# Patient Record
Sex: MALE | Race: WHITE | Employment: UNEMPLOYED | URBAN - NONMETROPOLITAN AREA
[De-identification: names, ages, dates, MRNs, and addresses within clinical notes are randomized per-mention and may not be internally consistent; named-entity substitution may affect disease eponyms.]

---

## 2023-09-05 ENCOUNTER — HOSPITAL ENCOUNTER (INPATIENT)
Facility: HOSPITAL | Age: 68
LOS: 9 days | Discharge: HOME | DRG: 885 | End: 2023-09-14
Attending: PSYCHIATRY & NEUROLOGY | Admitting: PSYCHIATRY & NEUROLOGY
Payer: MEDICARE

## 2023-09-05 PROBLEM — F32.9 MAJOR DEPRESSIVE DISORDER WITH SINGLE EPISODE: Status: ACTIVE | Noted: 2023-09-05

## 2023-09-05 PROCEDURE — 1240000000 HC EMOTIONAL WELLNESS R&B

## 2023-09-05 PROCEDURE — 6370000000 HC RX 637 (ALT 250 FOR IP): Performed by: PSYCHIATRY & NEUROLOGY

## 2023-09-05 PROCEDURE — 97161 PT EVAL LOW COMPLEX 20 MIN: CPT

## 2023-09-05 RX ORDER — LISINOPRIL 10 MG/1
10 TABLET ORAL DAILY
Status: ON HOLD | COMMUNITY
End: 2023-09-14 | Stop reason: SDUPTHER

## 2023-09-05 RX ORDER — HYDROCHLOROTHIAZIDE 12.5 MG/1
12.5 CAPSULE, GELATIN COATED ORAL DAILY
Status: ON HOLD | COMMUNITY
End: 2023-09-14 | Stop reason: SDUPTHER

## 2023-09-05 RX ORDER — HALOPERIDOL 5 MG/1
5 TABLET ORAL EVERY 4 HOURS PRN
Status: DISCONTINUED | OUTPATIENT
Start: 2023-09-05 | End: 2023-09-14 | Stop reason: HOSPADM

## 2023-09-05 RX ORDER — HYDROCHLOROTHIAZIDE 12.5 MG/1
12.5 CAPSULE, GELATIN COATED ORAL DAILY
Status: DISCONTINUED | OUTPATIENT
Start: 2023-09-05 | End: 2023-09-14 | Stop reason: HOSPADM

## 2023-09-05 RX ORDER — ACETAMINOPHEN 325 MG/1
650 TABLET ORAL EVERY 4 HOURS PRN
Status: DISCONTINUED | OUTPATIENT
Start: 2023-09-05 | End: 2023-09-14 | Stop reason: HOSPADM

## 2023-09-05 RX ORDER — HALOPERIDOL 5 MG/ML
5 INJECTION INTRAMUSCULAR EVERY 4 HOURS PRN
Status: DISCONTINUED | OUTPATIENT
Start: 2023-09-05 | End: 2023-09-14 | Stop reason: HOSPADM

## 2023-09-05 RX ORDER — NICOTINE 21 MG/24HR
1 PATCH, TRANSDERMAL 24 HOURS TRANSDERMAL DAILY
Status: DISCONTINUED | OUTPATIENT
Start: 2023-09-05 | End: 2023-09-14 | Stop reason: HOSPADM

## 2023-09-05 RX ORDER — LISINOPRIL 10 MG/1
10 TABLET ORAL DAILY
Status: DISCONTINUED | OUTPATIENT
Start: 2023-09-05 | End: 2023-09-14 | Stop reason: HOSPADM

## 2023-09-05 RX ORDER — DIPHENHYDRAMINE HYDROCHLORIDE 50 MG/ML
50 INJECTION INTRAMUSCULAR; INTRAVENOUS EVERY 4 HOURS PRN
Status: DISCONTINUED | OUTPATIENT
Start: 2023-09-05 | End: 2023-09-14 | Stop reason: HOSPADM

## 2023-09-05 RX ORDER — POLYETHYLENE GLYCOL 3350 17 G/17G
17 POWDER, FOR SOLUTION ORAL DAILY PRN
Status: DISCONTINUED | OUTPATIENT
Start: 2023-09-05 | End: 2023-09-14 | Stop reason: HOSPADM

## 2023-09-05 RX ORDER — MAGNESIUM HYDROXIDE/ALUMINUM HYDROXICE/SIMETHICONE 120; 1200; 1200 MG/30ML; MG/30ML; MG/30ML
30 SUSPENSION ORAL EVERY 6 HOURS PRN
Status: DISCONTINUED | OUTPATIENT
Start: 2023-09-05 | End: 2023-09-14 | Stop reason: HOSPADM

## 2023-09-05 RX ORDER — TRAZODONE HYDROCHLORIDE 50 MG/1
50 TABLET ORAL NIGHTLY PRN
Status: DISCONTINUED | OUTPATIENT
Start: 2023-09-05 | End: 2023-09-14 | Stop reason: HOSPADM

## 2023-09-05 RX ADMIN — HYDROCHLOROTHIAZIDE 12.5 MG: 12.5 CAPSULE ORAL at 14:35

## 2023-09-05 RX ADMIN — LISINOPRIL 10 MG: 10 TABLET ORAL at 14:35

## 2023-09-05 RX ADMIN — TRAZODONE HYDROCHLORIDE 50 MG: 50 TABLET ORAL at 21:54

## 2023-09-05 RX ADMIN — METFORMIN HYDROCHLORIDE 500 MG: 500 TABLET ORAL at 18:45

## 2023-09-05 ASSESSMENT — LIFESTYLE VARIABLES
HOW MANY STANDARD DRINKS CONTAINING ALCOHOL DO YOU HAVE ON A TYPICAL DAY: 1 OR 2
HOW OFTEN DO YOU HAVE A DRINK CONTAINING ALCOHOL: 2-4 TIMES A MONTH

## 2023-09-05 ASSESSMENT — PAIN DESCRIPTION - FREQUENCY: FREQUENCY: CONTINUOUS

## 2023-09-05 ASSESSMENT — PAIN DESCRIPTION - LOCATION: LOCATION: KNEE;BACK;NECK

## 2023-09-05 ASSESSMENT — SLEEP AND FATIGUE QUESTIONNAIRES
AVERAGE NUMBER OF SLEEP HOURS: 4
DO YOU USE A SLEEP AID: NO
DO YOU HAVE DIFFICULTY SLEEPING: NO

## 2023-09-05 ASSESSMENT — PAIN DESCRIPTION - DESCRIPTORS: DESCRIPTORS: ACHING

## 2023-09-05 ASSESSMENT — PAIN DESCRIPTION - ORIENTATION: ORIENTATION: LOWER

## 2023-09-05 ASSESSMENT — PAIN SCALES - GENERAL: PAINLEVEL_OUTOF10: 6

## 2023-09-05 ASSESSMENT — PAIN DESCRIPTION - ONSET: ONSET: ON-GOING

## 2023-09-05 ASSESSMENT — PAIN DESCRIPTION - PAIN TYPE: TYPE: CHRONIC PAIN

## 2023-09-05 NOTE — GROUP NOTE
Group Therapy Note    Date: 9/5/2023    Group Start Time: 1245  Group End Time: 1320  Group Topic: Education Group - Inpatient     [address]        Group Therapy Note    Attendees: 3/4    Writer facilitated a psych-ed group about toxic positivity. Writer provided a handout about various examples of toxic positivity statements as opposed to validating statements. Writer encouraged patients to share and ask questions. Writer concluded session with a grounding exercise. Pt in the process of being admitted at time of group.            Signature:  Belkys Rojas

## 2023-09-05 NOTE — GROUP NOTE
Group Therapy Note    Date: 9/5/2023    Group Start Time: 4089  Group End Time: 1600  Group Topic: Activity    [facility] [address], LPN        Group Therapy Note    Attendees: 4       Patient's Goal: TO REST    Notes:  RELAXATION TECHNIQUES    Status After Intervention:  Improved    Participation Level:  Active Listener    Participation Quality: Appropriate and Attentive      Speech:  normal      Thought Process/Content: Logical      Affective Functioning: Congruent      Mood:  CALM      Level of consciousness:  Alert      Response to Learning: Able to verbalize current knowledge/experience      Endings: None Reported    Modes of Intervention: Socialization      Discipline Responsible: Behavorial Health Tech      Signature:  Cara Sultana LPN

## 2023-09-05 NOTE — GROUP NOTE
Group Therapy Note    Date: 9/5/2023    Group Start Time: 1320  Group End Time: 1400  Group Topic: Process Group - Inpatient     [address]        Group Therapy Note    Attendees: 3/4    Writer facilitated an inpatient process group. Writer encouraged patients to engage in an expressive arts activity in which patients were asked to create vision boards. Writer held the space as patients discussed discharge plans, expressed feelings, etc. Writer concluded session with some mindfulness exercises and encouraging patients to ask questions and provide input and feedback regarding the group. Pt came at the end of group. Pt in process of being admitted.        Signature:  Eze Elder

## 2023-09-06 LAB
CHOLEST SERPL-MCNC: 169 MG/DL
DATE LAST DOSE: NORMAL
DOSE AMOUNT: NORMAL UNITS
EST. AVERAGE GLUCOSE BLD GHB EST-MCNC: 160 MG/DL
GLUCOSE BLD STRIP.AUTO-MCNC: 160 MG/DL (ref 65–100)
GLUCOSE BLD STRIP.AUTO-MCNC: 199 MG/DL (ref 65–100)
GLUCOSE BLD STRIP.AUTO-MCNC: 220 MG/DL (ref 65–100)
HBA1C MFR BLD: 7.2 % (ref 4–5.6)
HDLC SERPL-MCNC: 81 MG/DL
HDLC SERPL: 2.1 (ref 0–5)
LDLC SERPL CALC-MCNC: 68.8 MG/DL (ref 0–100)
LIPID PANEL: NORMAL
LITHIUM SERPL-SCNC: <0.2 MMOL/L
PERFORMED BY:: ABNORMAL
TRIGL SERPL-MCNC: 96 MG/DL
VLDLC SERPL CALC-MCNC: 19.2 MG/DL

## 2023-09-06 PROCEDURE — 80178 ASSAY OF LITHIUM: CPT

## 2023-09-06 PROCEDURE — 1240000000 HC EMOTIONAL WELLNESS R&B

## 2023-09-06 PROCEDURE — 83036 HEMOGLOBIN GLYCOSYLATED A1C: CPT

## 2023-09-06 PROCEDURE — 82962 GLUCOSE BLOOD TEST: CPT

## 2023-09-06 PROCEDURE — 80061 LIPID PANEL: CPT

## 2023-09-06 PROCEDURE — 6370000000 HC RX 637 (ALT 250 FOR IP): Performed by: PSYCHIATRY & NEUROLOGY

## 2023-09-06 PROCEDURE — 36415 COLL VENOUS BLD VENIPUNCTURE: CPT

## 2023-09-06 PROCEDURE — 6370000000 HC RX 637 (ALT 250 FOR IP): Performed by: PHYSICIAN ASSISTANT

## 2023-09-06 RX ORDER — LITHIUM CARBONATE 300 MG/1
300 CAPSULE ORAL 2 TIMES DAILY
Status: DISCONTINUED | OUTPATIENT
Start: 2023-09-06 | End: 2023-09-09

## 2023-09-06 RX ORDER — INSULIN LISPRO 100 [IU]/ML
0-8 INJECTION, SOLUTION INTRAVENOUS; SUBCUTANEOUS
Status: DISCONTINUED | OUTPATIENT
Start: 2023-09-06 | End: 2023-09-14 | Stop reason: HOSPADM

## 2023-09-06 RX ADMIN — ACETAMINOPHEN 650 MG: 325 TABLET, FILM COATED ORAL at 13:02

## 2023-09-06 RX ADMIN — LISINOPRIL 10 MG: 10 TABLET ORAL at 08:18

## 2023-09-06 RX ADMIN — METFORMIN HYDROCHLORIDE 500 MG: 500 TABLET ORAL at 17:14

## 2023-09-06 RX ADMIN — LITHIUM CARBONATE 300 MG: 300 CAPSULE, GELATIN COATED ORAL at 20:16

## 2023-09-06 RX ADMIN — METFORMIN HYDROCHLORIDE 500 MG: 500 TABLET ORAL at 08:18

## 2023-09-06 RX ADMIN — HYDROCHLOROTHIAZIDE 12.5 MG: 12.5 CAPSULE ORAL at 08:18

## 2023-09-06 ASSESSMENT — ENCOUNTER SYMPTOMS
ALLERGIC/IMMUNOLOGIC NEGATIVE: 1
EYES NEGATIVE: 1
GASTROINTESTINAL NEGATIVE: 1
RESPIRATORY NEGATIVE: 1

## 2023-09-06 ASSESSMENT — PAIN DESCRIPTION - LOCATION: LOCATION: GENERALIZED

## 2023-09-06 ASSESSMENT — PAIN - FUNCTIONAL ASSESSMENT: PAIN_FUNCTIONAL_ASSESSMENT: ACTIVITIES ARE NOT PREVENTED

## 2023-09-06 ASSESSMENT — PAIN DESCRIPTION - DESCRIPTORS: DESCRIPTORS: ACHING

## 2023-09-06 NOTE — GROUP NOTE
Group Therapy Note    Date: 9/6/2023    Group Start Time: 1600  Group End Time: 8047  Group Topic: Nursing    [facility] [address] Annalise Monroe RN      Discharge Planning and Resources  Group Therapy Note    Attendees: 6/7       Patient's Goal:      Notes: It attended group, participating well. Difficulty staying on topic. Status After Intervention:  Unchanged    Participation Level:  Active Listener and Interactive, monopolizing    Participation Quality: Appropriate, Attentive, and Sharing      Speech:  normal      Thought Process/Content: Delusional      Affective Functioning: Congruent      Mood: elevated      Level of consciousness:  Alert, Oriented x4, and Attentive      Response to Learning: Able to verbalize current knowledge/experience, Able to verbalize/acknowledge new learning, Able to retain information, Capable of insight, Able to change behavior, and Progressing to goal      Endings: None Reported    Modes of Intervention: Education and Support      Discipline Responsible: Registered Nurse      Signature:  Kenia Jewell RN

## 2023-09-06 NOTE — GROUP NOTE
Group Therapy Note    Date: 9/6/2023    Group Start Time: 1410  Group End Time: 8224  Group Topic: Process Group - Inpatient     [address]        Group Therapy Note    Attendees: 5/7    Writer facilitated an inpatient process group. Writer implemented various mindfulness and expressive arts exercises. Writer held the space as patients processed. Writer encouraged patients to process any feelings they may be having, discuss discharge plans, etc. Writer reviewed coping skills and topics learned earlier in the day. Writer concluded session with a grounding exercise and encouraging patients to provide input and feedback regarding the group. Patient's Goal:  To attend and participate in groups and activities daily. Notes:  Pt actively participated. Pt engaged in mindfulness and was able to discuss discharge plans. Pt manic and interrupting but able to be redirected. Status After Intervention:  Improved    Participation Level:  Active Listener and Interactive    Participation Quality: Appropriate, Attentive, Sharing, and Supportive      Speech:  normal      Thought Process/Content: Logical  Linear  Flight of ideas      Affective Functioning: Congruent      Mood: euthymic      Level of consciousness:  Alert, Oriented x4, and Attentive      Response to Learning: Able to verbalize/acknowledge new learning, Able to retain information, Capable of insight, and Progressing to goal      Endings: None Reported    Modes of Intervention: Exploration and Activity      Discipline Responsible: /Counselor      Signature:  Nadeen Pimentel, [address]

## 2023-09-06 NOTE — GROUP NOTE
Group Therapy Note    Date: 9/6/2023    Group Start Time: 1330  Group End Time: 0019  Group Topic: Education Group - Inpatient     [address]        Group Therapy Note    Attendees: 5/7    Writer facilitated an inpatient psych-ed group. Writer provided a CBT handout regarding core beliefs and encouraged patients to fill out the information regarding negative core beliefs they feel they need to work on. Writer provided education regarding how core beliefs can impact our way of thinking and mental health. Writer concluded session with a grounding exercise and encouraging patients to share and ask questions. Patient's Goal:  To attend and participate in groups and activities daily    Notes:  Pt actively participated. Pt was able to discuss his feelings regarding negative core beliefs. Pt slightly manic but able to be redirected. Status After Intervention:  Improved    Participation Level:  Active Listener and Interactive    Participation Quality: Appropriate, Attentive, Sharing, and Supportive      Speech:  loud      Thought Process/Content: Logical  Linear  Flight of ideas      Affective Functioning: Congruent      Mood: euthymic      Level of consciousness:  Alert, Oriented x4, and Attentive      Response to Learning: Able to verbalize current knowledge/experience, Able to verbalize/acknowledge new learning, Able to retain information, and Progressing to goal      Endings: None Reported    Modes of Intervention: Education      Discipline Responsible: /Counselor      Signature:  Asad Baptiste [facility]

## 2023-09-06 NOTE — PLAN OF CARE
Problem: Discharge Planning  Goal: Discharge to home or other facility with appropriate resources  Outcome: Progressing     Problem: Risk for Elopement  Goal: Patient will not exit the unit/facility without proper excort  9/5/2023 2318 by Windy Mishra RN  Outcome: Progressing  9/5/2023 1614 by Juan Cadena LPN  Outcome: Progressing     Problem: Safety - Adult  Goal: Free from fall injury  9/5/2023 2318 by Windy Mishra RN  Outcome: Progressing  9/5/2023 1614 by Juan Cadena LPN  Outcome: Progressing     Problem: Pain  Goal: Verbalizes/displays adequate comfort level or baseline comfort level  Outcome: Progressing

## 2023-09-06 NOTE — CONSULTS
Hospitalist Consult Note             Chief Complaints:   Suicidal ideation      Subjective:     Kailee Stewart is a 80 y.o. male followed by None None and  has a past medical history of Diabetes mellitus ([address]) and Hypertension. Presents with suicidal ideations with thoughts of jumping off a bridge near train tracks and running into traffic when he was initially evaluated on behavioral health screening but did not have any complaint on my evaluation. He admitted not taking anything for his blood pressure and he stated that his diabetes is well controlled off medications. Patient had a number of grandiose stories and unable to confirm. Patient appears hyperverbal and has been very sociable since arriving to the behavioral health unit. He was initially evaluated in [facility] emergency room and was then referred for admission to behavioral health unit for further evaluation and treatment      Past Medical History:   Diagnosis Date    Diabetes mellitus ([address])     Hypertension       No past surgical history on file. Family History   Problem Relation Age of Onset    Cancer Mother         Polio    Cancer Sister         Breast      Social History     Tobacco Use    Smoking status: Former     Types: Cigars    Smokeless tobacco: Former     Types: Chew   Substance Use Topics    Alcohol use: Yes     Alcohol/week: 2.0 standard drinks     Types: 2 Cans of beer per week       Prior to Admission medications    Medication Sig Start Date End Date Taking? Authorizing Provider   hydroCHLOROthiazide (MICROZIDE) 12.5 MG capsule Take 1 capsule by mouth daily   Yes Historical Provider, MD   lisinopril (PRINIVIL;ZESTRIL) 10 MG tablet Take 1 tablet by mouth daily   Yes Historical Provider, MD   metFORMIN (GLUCOPHAGE) 500 MG tablet Take 1 tablet by mouth 2 times daily (with meals)   Yes Historical Provider, MD     No Known Allergies     Review of Systems:  Review of Systems   Constitutional: Negative. HENT: Negative.      Eyes: Communicate Fall Risk and Risk Factors to all staff, patient, and family/Reinforce activity limits and safety measures with patient and family/Visual Cue: Yellow wristband/Bed in lowest position, wheels locked, appropriate side rails in place/Call bell, personal items and telephone in reach/Instruct patient to call for assistance before getting out of bed or chair/Non-slip footwear when patient is out of bed/[place] to call system/Physically safe environment - no spills, clutter or unnecessary equipment/Purposeful Proactive Rounding/Room/bathroom lighting operational, light cord in reach

## 2023-09-06 NOTE — GROUP NOTE
Group Therapy Note    Date: 9/6/2023    Group Start Time: 1000  Group End Time: 0926  Group Topic: Community Meeting    [facility] [address], LPN        Group Therapy Note    Attendees: 6       Patient's Goal:  HELP MYSELF AND OTHERS TO GET WELL AND MAKE NEW FRIENDS. Notes:  COMMUNITY    Status After Intervention:  Unchanged    Participation Level:  Active Listener    Participation Quality: Appropriate and Attentive      Speech:  normal      Thought Process/Content: Logical      Affective Functioning: Congruent      Mood:  CALM      Level of consciousness:  Alert      Response to Learning: Able to verbalize current knowledge/experience      Endings: None Reported    Modes of Intervention: Support      Discipline Responsible: Behavorial Health Tech      Signature:  Anya Goode LPN

## 2023-09-07 LAB
GLUCOSE BLD STRIP.AUTO-MCNC: 139 MG/DL (ref 65–100)
GLUCOSE BLD STRIP.AUTO-MCNC: 147 MG/DL (ref 65–100)
GLUCOSE BLD STRIP.AUTO-MCNC: 183 MG/DL (ref 65–100)
GLUCOSE BLD STRIP.AUTO-MCNC: 235 MG/DL (ref 65–100)
PERFORMED BY:: ABNORMAL

## 2023-09-07 PROCEDURE — 82962 GLUCOSE BLOOD TEST: CPT

## 2023-09-07 PROCEDURE — 6370000000 HC RX 637 (ALT 250 FOR IP): Performed by: PHYSICIAN ASSISTANT

## 2023-09-07 PROCEDURE — 1240000000 HC EMOTIONAL WELLNESS R&B

## 2023-09-07 PROCEDURE — 6370000000 HC RX 637 (ALT 250 FOR IP): Performed by: PSYCHIATRY & NEUROLOGY

## 2023-09-07 RX ADMIN — TRAZODONE HYDROCHLORIDE 50 MG: 50 TABLET ORAL at 21:11

## 2023-09-07 RX ADMIN — HYDROCHLOROTHIAZIDE 12.5 MG: 12.5 CAPSULE ORAL at 08:05

## 2023-09-07 RX ADMIN — LITHIUM CARBONATE 300 MG: 300 CAPSULE, GELATIN COATED ORAL at 08:05

## 2023-09-07 RX ADMIN — LISINOPRIL 10 MG: 10 TABLET ORAL at 08:05

## 2023-09-07 RX ADMIN — LITHIUM CARBONATE 300 MG: 300 CAPSULE, GELATIN COATED ORAL at 21:00

## 2023-09-07 RX ADMIN — METFORMIN HYDROCHLORIDE 500 MG: 500 TABLET ORAL at 17:06

## 2023-09-07 RX ADMIN — METFORMIN HYDROCHLORIDE 500 MG: 500 TABLET ORAL at 08:05

## 2023-09-07 ASSESSMENT — PAIN SCALES - GENERAL
PAINLEVEL_OUTOF10: 0

## 2023-09-07 NOTE — GROUP NOTE
Group Therapy Note    Date: 9/7/2023    Group Start Time: 0238  Group End Time: 7286  Group Topic: Education Group - Inpatient     [address]        Group Therapy Note    Attendees: 5/6    Writer facilitated an inpatient psych-ed group. Writer provided a handout regarding \"Fair Fighting Rules\" and discussed assertive communication versus aggressive communication. Writer encouraged patients to share areas they feel they need to work on. Writer concluded session with a grounding exercise and review. Patient's Goal:  To attend and participate in groups and activities daily. Notes:  Pt actively participated. Pt was able to discuss examples of fair fighting rules and gained some insight regarding communication. Status After Intervention:  Improved    Participation Level:  Active Listener and Interactive    Participation Quality: Appropriate, Attentive, Sharing, and Supportive      Speech:  normal      Thought Process/Content: Logical  Linear      Affective Functioning: Congruent      Mood: elevated      Level of consciousness:  Alert, Oriented x4, and Attentive      Response to Learning: Able to verbalize current knowledge/experience, Able to retain information, Capable of insight, and Progressing to goal      Endings: None Reported    Modes of Intervention: Education      Discipline Responsible: /Counselor      Signature:  Zayra Stern"[employer]"

## 2023-09-07 NOTE — CARE COORDINATION
09/07/23 1122   ITP   Date of Plan 09/07/23   Primary Diagnosis Code Major Depressive disorder with single episode   Barriers to Treatment Client resistance; Need for psychoeducation;Psychiatric symptom (comment)   Strengths Incorporated in Plan Acknowledging need for assistance;Community supports; Family supports; Natural supports;Postive outlook; Seeking interactions   Plan of Care   Long Term Goal (LTG) Stated in patient/guardian terms See PSA   Short Term Goal 1   Short Term Goal 1 Client will participate in CBT treatment and education process   Baseline Functioning Unknown at this time   Objectives Client will participate in individual therapy;Client will participate in group therapy   Intervention 1 Group therapy   Frequency Daily   Measured by Behavioral data; Self report;Staff observation   Staff Responsible [facility] staff;Clinical staff   Intervention 2 Acknowledge client strengths   Frequency Daily   Measured by Behavioral data; Self report;Staff observation   Staff Responsible [facility] staff;Clinical staff   STG Goal 1 Status: Patient Appears to be  Partially meeting treatment plan goal   Short Term Goal 2   Short Term Goal 2 Client will maintain compliance with medication regime   Baseline Functioning Pt reports being prescribed lithium before   Objectives Client will participate in individual therapy;Client will participate in group therapy   Intervention 1 Monitor medications   Frequency Daily   Measured by Staff observation;Behavioral data   Staff Responsible [facility] staff   STG Goal 2 Status: Patient Appears to be  Progressing toward treatment plan goal   Crisis/Safety/Discharge Plan   Crisis/Safety Plan Individualized plan (comment)   Discharge Plan TBD

## 2023-09-07 NOTE — GROUP NOTE
Group Therapy Note    Date: 9/7/2023    Group Start Time: 1410  Group End Time: 1500  Group Topic: Process Group - Inpatient     [address]        Group Therapy Note    Attendees: 5/6    Writer facilitated an inpatient process group. Writer encouraged patients to engage in music therapy and mindfulness exercises. Writer held the space as patients processed. Writer encouraged patients to discuss feelings, discharge plans, etc. Writer concluded session with a grounding exercise and encouraging patients to provide input and feedback regarding the group. Patient's Goal:  To attend and participate in groups and activities daily. Notes:  Pt actively participated. Pt was able to discuss discharge plans and engaged in therapeutic activities. Pt slightly manic. Status After Intervention:  Improved    Participation Level:  Active Listener and Interactive    Participation Quality: Appropriate, Attentive, Sharing, and Supportive      Speech:  normal      Thought Process/Content: Logical  Linear  Flight of ideas      Affective Functioning: Congruent      Mood: euthymic      Level of consciousness:  Alert, Oriented x4, and Attentive      Response to Learning: Able to verbalize/acknowledge new learning, Able to retain information, Capable of insight, and Progressing to goal      Endings: None Reported    Modes of Intervention: Exploration and Activity      Discipline Responsible: /Counselor      Signature:  Zayra DonohueStitch

## 2023-09-07 NOTE — GROUP NOTE
Group Therapy Note    Date: 9/6/2023    Group Start Time: 2000  Group End Time: 2045  Group Topic: Wrap-Up    [facility] [address], CNA        Group Therapy Note    Attendees: 7       Patient's Goal:  to work on his own short coming and lack of patience    Notes:  Participated in group    Status After Intervention:  Improved    Participation Level: Active Listener and Interactive    Participation Quality: Appropriate and Attentive      Speech:  normal      Thought Process/Content: Logical      Affective Functioning: Congruent      Mood: anxious and depressed      Level of consciousness:  Alert, Oriented x4, and Attentive      Response to Learning: Able to verbalize current knowledge/experience, Able to verbalize/acknowledge new learning, Able to retain information, Capable of insight, Able to change behavior, and Progressing to goal      Endings: None Reported    Modes of Intervention: Activity      Discipline Responsible: Behavorial Health Tech      Signature:   Porter Singh CNA

## 2023-09-08 LAB
GLUCOSE BLD STRIP.AUTO-MCNC: 139 MG/DL (ref 65–100)
GLUCOSE BLD STRIP.AUTO-MCNC: 183 MG/DL (ref 65–100)
GLUCOSE BLD STRIP.AUTO-MCNC: 257 MG/DL (ref 65–100)
PERFORMED BY:: ABNORMAL

## 2023-09-08 PROCEDURE — 6370000000 HC RX 637 (ALT 250 FOR IP): Performed by: PSYCHIATRY & NEUROLOGY

## 2023-09-08 PROCEDURE — 1240000000 HC EMOTIONAL WELLNESS R&B

## 2023-09-08 PROCEDURE — 82962 GLUCOSE BLOOD TEST: CPT

## 2023-09-08 PROCEDURE — 6370000000 HC RX 637 (ALT 250 FOR IP): Performed by: PHYSICIAN ASSISTANT

## 2023-09-08 RX ORDER — OLANZAPINE 2.5 MG/1
2.5 TABLET ORAL
Status: COMPLETED | OUTPATIENT
Start: 2023-09-08 | End: 2023-09-08

## 2023-09-08 RX ORDER — OLANZAPINE 2.5 MG/1
5 TABLET ORAL NIGHTLY
Status: DISCONTINUED | OUTPATIENT
Start: 2023-09-08 | End: 2023-09-10

## 2023-09-08 RX ADMIN — OLANZAPINE 2.5 MG: 2.5 TABLET, FILM COATED ORAL at 08:44

## 2023-09-08 RX ADMIN — LITHIUM CARBONATE 300 MG: 300 CAPSULE, GELATIN COATED ORAL at 08:23

## 2023-09-08 RX ADMIN — HYDROCHLOROTHIAZIDE 12.5 MG: 12.5 CAPSULE ORAL at 08:23

## 2023-09-08 RX ADMIN — METFORMIN HYDROCHLORIDE 500 MG: 500 TABLET ORAL at 18:23

## 2023-09-08 RX ADMIN — ACETAMINOPHEN 650 MG: 325 TABLET, FILM COATED ORAL at 21:30

## 2023-09-08 RX ADMIN — TRAZODONE HYDROCHLORIDE 50 MG: 50 TABLET ORAL at 21:30

## 2023-09-08 RX ADMIN — METFORMIN HYDROCHLORIDE 500 MG: 500 TABLET ORAL at 08:23

## 2023-09-08 RX ADMIN — LISINOPRIL 10 MG: 10 TABLET ORAL at 08:23

## 2023-09-08 RX ADMIN — OLANZAPINE 5 MG: 2.5 TABLET, FILM COATED ORAL at 21:17

## 2023-09-08 RX ADMIN — LITHIUM CARBONATE 300 MG: 300 CAPSULE, GELATIN COATED ORAL at 21:17

## 2023-09-08 ASSESSMENT — PAIN SCALES - GENERAL
PAINLEVEL_OUTOF10: 0
PAINLEVEL_OUTOF10: 4
PAINLEVEL_OUTOF10: 0
PAINLEVEL_OUTOF10: 9

## 2023-09-08 ASSESSMENT — PAIN DESCRIPTION - LOCATION: LOCATION: OTHER (COMMENT)

## 2023-09-08 ASSESSMENT — PAIN DESCRIPTION - DESCRIPTORS: DESCRIPTORS: ACHING

## 2023-09-08 NOTE — GROUP NOTE
Group Therapy Note    Date: 9/7/2023    Group Start Time: 2000  Group End Time: 2045  Group Topic: Wrap-Up    [facility] [address], CNA        Group Therapy Note    Attendees: 5       Patient's Goal:  to work on self so that he can help others    Notes:  participated in group    Status After Intervention:  Improved    Participation Level: Active Listener    Participation Quality: Appropriate and Attentive      Speech:  normal      Thought Process/Content: Logical      Affective Functioning: Congruent      Mood: anxious and depressed      Level of consciousness:  Alert and Oriented x4      Response to Learning: Able to verbalize current knowledge/experience, Able to verbalize/acknowledge new learning, Able to retain information, Capable of insight, Able to change behavior, and Progressing to goal      Endings: None Reported    Modes of Intervention: Activity      Discipline Responsible: [employer]      Signature:   David Palma CNA

## 2023-09-08 NOTE — GROUP NOTE
Group Therapy Note    Date: 9/8/2023    Group Start Time: 0900  Group End Time: 1030  Group Topic: Community Meeting    [facility]        Group Therapy Note    Attendees:        Patient's Goal:  Formulate a plan for Breast Cancer    Notes:    Sleep 6-7 hrs :   Food quantity: 95%  Depression   2 Anxiety : 3  Pain: 6-8  Progress;  No Comment  Symptom Management:  No response   Had Problem handlining in last 24 hours: No response   Thoughts of self harm: None   Taking prescribed medication  : Yes  Medication side effects: None Yet  Would  like to talk to Physician about: Breast Cancer & possible treatment       Status After Intervention:  Unchanged    Participation Level: Monopolizing    Participation Quality: Intrusive      Speech:  normal      Thought Process/Content: Flight of ideas      Affective Functioning: Congruent      Mood: elevated and        Level of consciousness:  Alert      Response to Learning: Able to retain information      Endings: None Reported    Modes of Intervention: Reality-testing      Discipline Responsible: [address]      Signature:  Tonio Hammond

## 2023-09-09 PROBLEM — F31.9 BIPOLAR 1 DISORDER (HCC): Status: RESOLVED | Noted: 2023-09-09 | Resolved: 2023-09-09

## 2023-09-09 PROBLEM — F32.9 MAJOR DEPRESSIVE DISORDER WITH SINGLE EPISODE: Status: RESOLVED | Noted: 2023-09-05 | Resolved: 2023-09-09

## 2023-09-09 PROBLEM — F31.9 BIPOLAR 1 DISORDER (HCC): Status: ACTIVE | Noted: 2023-09-09

## 2023-09-09 PROBLEM — F31.64 BIPOLAR DISORD, CRNT EPISODE MIXED, SEVERE, W PSYCH FEATURES (HCC): Status: ACTIVE | Noted: 2023-09-09

## 2023-09-09 LAB
ALBUMIN SERPL-MCNC: 3.9 G/DL (ref 3.5–5)
ALBUMIN/GLOB SERPL: 1.1 (ref 1.1–2.2)
ALP SERPL-CCNC: 51 U/L (ref 45–117)
ALT SERPL-CCNC: 34 U/L (ref 12–78)
ANION GAP SERPL CALC-SCNC: 5 MMOL/L (ref 5–15)
AST SERPL W P-5'-P-CCNC: 21 U/L (ref 15–37)
BILIRUB SERPL-MCNC: 0.6 MG/DL (ref 0.2–1)
BUN SERPL-MCNC: 18 MG/DL (ref 6–20)
BUN/CREAT SERPL: 19 (ref 12–20)
CA-I BLD-MCNC: 10 MG/DL (ref 8.5–10.1)
CHLORIDE SERPL-SCNC: 100 MMOL/L (ref 97–108)
CO2 SERPL-SCNC: 33 MMOL/L (ref 21–32)
CREAT SERPL-MCNC: 0.93 MG/DL (ref 0.7–1.3)
DATE LAST DOSE: NORMAL
DOSE AMOUNT: NORMAL UNITS
GLOBULIN SER CALC-MCNC: 3.5 G/DL (ref 2–4)
GLUCOSE BLD STRIP.AUTO-MCNC: 140 MG/DL (ref 65–100)
GLUCOSE SERPL-MCNC: 151 MG/DL (ref 65–100)
LITHIUM SERPL-SCNC: 0.49 MMOL/L
PERFORMED BY:: ABNORMAL
POTASSIUM SERPL-SCNC: 3.7 MMOL/L (ref 3.5–5.1)
PROT SERPL-MCNC: 7.4 G/DL (ref 6.4–8.2)
SODIUM SERPL-SCNC: 138 MMOL/L (ref 136–145)

## 2023-09-09 PROCEDURE — 6370000000 HC RX 637 (ALT 250 FOR IP): Performed by: PHYSICIAN ASSISTANT

## 2023-09-09 PROCEDURE — 36415 COLL VENOUS BLD VENIPUNCTURE: CPT

## 2023-09-09 PROCEDURE — 80053 COMPREHEN METABOLIC PANEL: CPT

## 2023-09-09 PROCEDURE — 82962 GLUCOSE BLOOD TEST: CPT

## 2023-09-09 PROCEDURE — 1240000000 HC EMOTIONAL WELLNESS R&B

## 2023-09-09 PROCEDURE — 80178 ASSAY OF LITHIUM: CPT

## 2023-09-09 PROCEDURE — 6370000000 HC RX 637 (ALT 250 FOR IP): Performed by: PSYCHIATRY & NEUROLOGY

## 2023-09-09 RX ORDER — LITHIUM CARBONATE 300 MG/1
300 CAPSULE ORAL DAILY
Status: DISCONTINUED | OUTPATIENT
Start: 2023-09-09 | End: 2023-09-14 | Stop reason: HOSPADM

## 2023-09-09 RX ORDER — LITHIUM CARBONATE 300 MG/1
600 CAPSULE ORAL
Status: DISCONTINUED | OUTPATIENT
Start: 2023-09-09 | End: 2023-09-14 | Stop reason: HOSPADM

## 2023-09-09 RX ADMIN — LITHIUM CARBONATE 300 MG: 300 CAPSULE, GELATIN COATED ORAL at 07:38

## 2023-09-09 RX ADMIN — HYDROCHLOROTHIAZIDE 12.5 MG: 12.5 CAPSULE ORAL at 07:38

## 2023-09-09 RX ADMIN — LITHIUM CARBONATE 600 MG: 300 CAPSULE, GELATIN COATED ORAL at 21:27

## 2023-09-09 RX ADMIN — METFORMIN HYDROCHLORIDE 500 MG: 500 TABLET ORAL at 07:38

## 2023-09-09 RX ADMIN — METFORMIN HYDROCHLORIDE 500 MG: 500 TABLET ORAL at 17:20

## 2023-09-09 RX ADMIN — OLANZAPINE 5 MG: 2.5 TABLET, FILM COATED ORAL at 21:27

## 2023-09-09 RX ADMIN — TRAZODONE HYDROCHLORIDE 50 MG: 50 TABLET ORAL at 21:27

## 2023-09-09 RX ADMIN — LISINOPRIL 10 MG: 10 TABLET ORAL at 07:38

## 2023-09-09 RX ADMIN — ACETAMINOPHEN 650 MG: 325 TABLET, FILM COATED ORAL at 21:28

## 2023-09-09 ASSESSMENT — PAIN DESCRIPTION - LOCATION: LOCATION: OTHER (COMMENT)

## 2023-09-09 ASSESSMENT — PAIN SCALES - GENERAL
PAINLEVEL_OUTOF10: 0
PAINLEVEL_OUTOF10: 9
PAINLEVEL_OUTOF10: 0

## 2023-09-09 ASSESSMENT — PAIN DESCRIPTION - DESCRIPTORS: DESCRIPTORS: ACHING

## 2023-09-09 NOTE — GROUP NOTE
Group Therapy Note    Date: 9/8/2023    Group Start Time: 2000  Group End Time: 2045  Group Topic: Wrap-Up    [facility]    Demetrius James        Group Therapy Note    Attendees: 4         Patient's Goal:  none    Notes:  happy    Status After Intervention:  Improved    Participation Level:  Active Listener    Participation Quality: Appropriate      Speech:  normal      Thought Process/Content: Logical      Affective Functioning: Congruent      Mood:  happy      Level of consciousness:  Alert      Response to Learning: Able to verbalize current knowledge/experience      Endings: None Reported    Modes of Intervention: Socialization      Discipline Responsible: Behavorial Health Tech      Signature:  Demetrius James

## 2023-09-09 NOTE — GROUP NOTE
Group Therapy Note    Date: 9/9/2023    Group Start Time: 1100  Group End Time: 4851  Group Topic: Nursing    [facility]    Nuzhat Zamudio LPN        Group Therapy Note    Attendees: 5/5       Patient's Goal:  to get better and educate    Notes:  participated in group    Status After Intervention:  Unchanged    Participation Level:  Active Listener    Participation Quality: Appropriate, Attentive, Sharing, and Supportive      Speech:  normal      Thought Process/Content: Logical      Affective Functioning: Congruent      Mood: euthymic      Level of consciousness:  Alert, Oriented x4, and Attentive      Response to Learning: Able to retain information, Capable of insight, and Progressing to goal      Endings: None Reported    Modes of Intervention: Education      Discipline Responsible: Licensed Practical Nurse      Signature:  Refugio Salazar LPN

## 2023-09-09 NOTE — PLAN OF CARE
Problem: Discharge Planning  Goal: Discharge to home or other facility with appropriate resources  Outcome: Progressing     Problem: Risk for Elopement  Goal: Patient will not exit the unit/facility without proper excort  Outcome: Progressing     Problem: Safety - Adult  Goal: Free from fall injury  Outcome: Progressing     Problem: Pain  Goal: Verbalizes/displays adequate comfort level or baseline comfort level  Outcome: Progressing     Problem: Chronic Conditions and Co-morbidities  Goal: Patient's chronic conditions and co-morbidity symptoms are monitored and maintained or improved  Outcome: Progressing

## 2023-09-10 LAB
GLUCOSE BLD STRIP.AUTO-MCNC: 131 MG/DL (ref 65–100)
GLUCOSE BLD STRIP.AUTO-MCNC: 162 MG/DL (ref 65–100)
PERFORMED BY:: ABNORMAL
PERFORMED BY:: ABNORMAL

## 2023-09-10 PROCEDURE — 6370000000 HC RX 637 (ALT 250 FOR IP): Performed by: PSYCHIATRY & NEUROLOGY

## 2023-09-10 PROCEDURE — 82962 GLUCOSE BLOOD TEST: CPT

## 2023-09-10 PROCEDURE — 6370000000 HC RX 637 (ALT 250 FOR IP): Performed by: PHYSICIAN ASSISTANT

## 2023-09-10 PROCEDURE — 1240000000 HC EMOTIONAL WELLNESS R&B

## 2023-09-10 RX ORDER — OLANZAPINE 2.5 MG/1
10 TABLET ORAL NIGHTLY
Status: DISCONTINUED | OUTPATIENT
Start: 2023-09-10 | End: 2023-09-14 | Stop reason: HOSPADM

## 2023-09-10 RX ADMIN — HYDROCHLOROTHIAZIDE 12.5 MG: 12.5 CAPSULE ORAL at 08:22

## 2023-09-10 RX ADMIN — ACETAMINOPHEN 650 MG: 325 TABLET, FILM COATED ORAL at 21:27

## 2023-09-10 RX ADMIN — METFORMIN HYDROCHLORIDE 500 MG: 500 TABLET ORAL at 15:28

## 2023-09-10 RX ADMIN — LITHIUM CARBONATE 300 MG: 300 CAPSULE, GELATIN COATED ORAL at 08:22

## 2023-09-10 RX ADMIN — TRAZODONE HYDROCHLORIDE 50 MG: 50 TABLET ORAL at 21:27

## 2023-09-10 RX ADMIN — LISINOPRIL 10 MG: 10 TABLET ORAL at 08:22

## 2023-09-10 RX ADMIN — LITHIUM CARBONATE 600 MG: 300 CAPSULE, GELATIN COATED ORAL at 21:27

## 2023-09-10 RX ADMIN — ACETAMINOPHEN 650 MG: 325 TABLET, FILM COATED ORAL at 08:22

## 2023-09-10 RX ADMIN — OLANZAPINE 10 MG: 2.5 TABLET, FILM COATED ORAL at 21:26

## 2023-09-10 RX ADMIN — METFORMIN HYDROCHLORIDE 500 MG: 500 TABLET ORAL at 08:22

## 2023-09-10 RX ADMIN — ACETAMINOPHEN 650 MG: 325 TABLET, FILM COATED ORAL at 15:27

## 2023-09-10 ASSESSMENT — PAIN SCALES - GENERAL
PAINLEVEL_OUTOF10: 0
PAINLEVEL_OUTOF10: 9
PAINLEVEL_OUTOF10: 0

## 2023-09-10 ASSESSMENT — PAIN DESCRIPTION - DESCRIPTORS: DESCRIPTORS: ACHING

## 2023-09-10 ASSESSMENT — PAIN DESCRIPTION - LOCATION: LOCATION: OTHER (COMMENT)

## 2023-09-10 NOTE — GROUP NOTE
Group Therapy Note    Date: 9/9/2023    Group Start Time: 2000  Group End Time: 2045  Group Topic: Wrap-Up    [facility]    Baylee Hayward        Group Therapy Note    Attendees: 5         Patient's Goal:  none    Notes:  happy    Status After Intervention:  Improved    Participation Level:  Active Listener    Participation Quality: Appropriate      Speech:  normal      Thought Process/Content: Logical      Affective Functioning: Congruent      Mood:  happy      Level of consciousness:  Alert      Response to Learning: Able to verbalize current knowledge/experience      Endings: None Reported    Modes of Intervention: Socialization      Discipline Responsible: Behavorial Health Tech      Signature:  Baylee Hayward

## 2023-09-10 NOTE — GROUP NOTE
Group Therapy Note    Date: 9/10/2023    Group Start Time: 1100  Group End Time: 6825  Group Topic: Nursing    [facility] [address] [place], LPN        Group Therapy Note    Attendees: 4/5       Patient's Goal:  future goals completed    Notes:  Participated in group    Status After Intervention:  Unchanged    Participation Level:  Active Listener and Interactive    Participation Quality: Appropriate, Attentive, and Sharing      Speech:  normal      Thought Process/Content: Logical      Affective Functioning: Congruent      Mood: anxious and elevated      Level of consciousness:  Alert, Oriented x4, and Attentive      Response to Learning: Able to retain information, Capable of insight, and Progressing to goal      Endings: None Reported    Modes of Intervention: Education      Discipline Responsible: Licensed Practical Nurse      Signature:  Vaibhav Hong LPN

## 2023-09-11 LAB
GLUCOSE BLD STRIP.AUTO-MCNC: 134 MG/DL (ref 65–100)
GLUCOSE BLD STRIP.AUTO-MCNC: 167 MG/DL (ref 65–100)
GLUCOSE BLD STRIP.AUTO-MCNC: 176 MG/DL (ref 65–100)
PERFORMED BY:: ABNORMAL

## 2023-09-11 PROCEDURE — 6370000000 HC RX 637 (ALT 250 FOR IP): Performed by: PSYCHIATRY & NEUROLOGY

## 2023-09-11 PROCEDURE — 1240000000 HC EMOTIONAL WELLNESS R&B

## 2023-09-11 PROCEDURE — 6370000000 HC RX 637 (ALT 250 FOR IP): Performed by: PHYSICIAN ASSISTANT

## 2023-09-11 PROCEDURE — 82962 GLUCOSE BLOOD TEST: CPT

## 2023-09-11 RX ADMIN — LITHIUM CARBONATE 600 MG: 300 CAPSULE, GELATIN COATED ORAL at 20:44

## 2023-09-11 RX ADMIN — METFORMIN HYDROCHLORIDE 500 MG: 500 TABLET ORAL at 17:41

## 2023-09-11 RX ADMIN — TRAZODONE HYDROCHLORIDE 50 MG: 50 TABLET ORAL at 20:44

## 2023-09-11 RX ADMIN — OLANZAPINE 10 MG: 2.5 TABLET, FILM COATED ORAL at 20:44

## 2023-09-11 RX ADMIN — ACETAMINOPHEN 650 MG: 325 TABLET, FILM COATED ORAL at 22:24

## 2023-09-11 RX ADMIN — HYDROCHLOROTHIAZIDE 12.5 MG: 12.5 CAPSULE ORAL at 08:49

## 2023-09-11 RX ADMIN — LITHIUM CARBONATE 300 MG: 300 CAPSULE, GELATIN COATED ORAL at 08:49

## 2023-09-11 RX ADMIN — METFORMIN HYDROCHLORIDE 500 MG: 500 TABLET ORAL at 08:49

## 2023-09-11 RX ADMIN — ACETAMINOPHEN 650 MG: 325 TABLET, FILM COATED ORAL at 08:49

## 2023-09-11 RX ADMIN — LISINOPRIL 10 MG: 10 TABLET ORAL at 08:49

## 2023-09-11 ASSESSMENT — PAIN SCALES - GENERAL
PAINLEVEL_OUTOF10: 5
PAINLEVEL_OUTOF10: 0
PAINLEVEL_OUTOF10: 0
PAINLEVEL_OUTOF10: 9

## 2023-09-11 ASSESSMENT — PAIN DESCRIPTION - LOCATION
LOCATION: OTHER (COMMENT)
LOCATION: BACK;WRIST;KNEE

## 2023-09-11 ASSESSMENT — PAIN DESCRIPTION - PAIN TYPE: TYPE: CHRONIC PAIN

## 2023-09-11 ASSESSMENT — PAIN DESCRIPTION - DESCRIPTORS: DESCRIPTORS: ACHING

## 2023-09-11 NOTE — GROUP NOTE
Group Therapy Note    Date: 9/11/2023    Group Start Time: 36  Group End Time: 2718  Group Topic: Psychoeducation     [address]        Group Therapy Note    Attendees: 3/4    Writer facilitated a psych-education CBT group regarding Negative Thinking Errors. Writer provided a handout and reviewed the various type of thinking errors. Writer encouraged patients to process and discuss some negative thinking errors they would like to work on. Writer concluded session with a grounding exercise and encouraging patients to ask questions. Patient's Goal:  To attend and participate in groups and activities daily. Notes:  Pt actively participated. Pt was able to discuss negative thinking errors such as perfectionism. Status After Intervention:  Improved    Participation Level:  Active Listener and Interactive    Participation Quality: Appropriate, Attentive, Sharing, and Supportive      Speech:  normal      Thought Process/Content: Logical  Linear      Affective Functioning: Congruent      Mood: euthymic      Level of consciousness:  Alert, Oriented x4, and Attentive      Response to Learning: Able to verbalize current knowledge/experience, Able to verbalize/acknowledge new learning, Able to retain information, Capable of insight, and Progressing to goal      Endings: None Reported    Modes of Intervention: Education      Discipline Responsible: /Counselor      Signature:  Zayra [employer]

## 2023-09-11 NOTE — PLAN OF CARE
Problem: Discharge Planning  Goal: Discharge to home or other facility with appropriate resources  9/10/2023 2306 by Gail Winchester RN  Outcome: Progressing  9/10/2023 0934 by Clemente May LPN  Outcome: Progressing     Problem: Risk for Elopement  Goal: Patient will not exit the unit/facility without proper excort  9/10/2023 2306 by Gail Winchester RN  Outcome: Progressing  9/10/2023 0934 by Clemente May LPN  Outcome: Progressing     Problem: Safety - Adult  Goal: Free from fall injury  9/10/2023 2306 by Gail Winchester RN  Outcome: Progressing  9/10/2023 0934 by Clemente May LPN  Outcome: Progressing     Problem: Pain  Goal: Verbalizes/displays adequate comfort level or baseline comfort level  9/10/2023 2306 by Gail Winchester RN  Outcome: Progressing  9/10/2023 0934 by Clemente May LPN  Outcome: Progressing     Problem: Chronic Conditions and Co-morbidities  Goal: Patient's chronic conditions and co-morbidity symptoms are monitored and maintained or improved  9/10/2023 2306 by Gail Winchester RN  Outcome: Progressing  9/10/2023 0934 by Clemente May LPN  Outcome: Progressing

## 2023-09-11 NOTE — GROUP NOTE
Group Therapy Note    Date: 9/11/2023    Group Start Time: 5243  Group End Time: 5232  Group Topic: Activity    [facility] [address], LPN        Group Therapy Note    Attendees: 2       Patient's Goal:  SET UP CARE FOR WHEN I LEAVE. Notes:  [facility]    Status After Intervention:  Improved    Participation Level:  Active Listener    Participation Quality: Appropriate and Attentive      Speech:  normal      Thought Process/Content: Logical      Affective Functioning: Congruent      Mood:  CALM      Level of consciousness:  Alert      Response to Learning: Able to verbalize current knowledge/experience      Endings: None Reported    Modes of Intervention: Socialization      Discipline Responsible: Behavorial Health Tech      Signature:  Radha Juarez LPN

## 2023-09-11 NOTE — GROUP NOTE
Group Therapy Note    Date: 9/11/2023    Group Start Time: 1030  Group End Time: 1100  Group Topic: Community Meeting    [facility] [address], LPN        Group Therapy Note    Attendees: 4       Patient's Goal:  SET UP CARE FOR WHEN I LEAVE. Notes: COMMUNITY    Status After Intervention:    Improved  Participation Level:  Active Listener    Participation Quality: Appropriate and Attentive      Speech:  normal      Thought Process/Content: Logical      Affective Functioning: Congruent      Mood:  CALM      Level of consciousness:  Alert      Response to Learning: Able to verbalize current knowledge/experience      Endings: None Reported    Modes of Intervention: Support      Discipline Responsible: Behavorial Health Tech      Signature:  Luis Toth LPN

## 2023-09-11 NOTE — GROUP NOTE
Group Therapy Note    Date: 9/11/2023    Group Start Time: 1625  Group End Time: 1440  Group Topic: Process Group - Inpatient     [address]        Group Therapy Note    Attendees: 3/4    Writer facilitated an inpatient process group. Writer implemented various mindfulness and expressive arts activities. Writer encouraged patients to express feelings they may be having, discuss discharge plans, etc. Writer held the space as patients processed. Writer concluded session with a grounding exercise and encouraging patients to provide input and feedback regarding the group. Patient's Goal:  To attend and participate in groups and activities daily. Notes:  Pt actively participated. Pt was able to discuss goals and discharge plans for later in the week. Status After Intervention:  Improved    Participation Level:  Active Listener and Interactive    Participation Quality: Appropriate, Attentive, Sharing, and Supportive      Speech:  normal      Thought Process/Content: Logical  Linear      Affective Functioning: Congruent      Mood: euthymic      Level of consciousness:  Alert, Oriented x4, and Attentive      Response to Learning: Able to verbalize current knowledge/experience, Able to verbalize/acknowledge new learning, Able to retain information, and Progressing to goal      Endings: None Reported    Modes of Intervention: Exploration and Activity      Discipline Responsible: /Counselor      Signature:  [employer]

## 2023-09-11 NOTE — GROUP NOTE
Group Therapy Note    Date: 9/11/2023    Group Start Time: 1100  Group End Time: 2749  Group Topic: Education Group - Inpatient    [facility] [address], LPN        Group Therapy Note    Attendees: 4       Patient's Goal:  SET UP CARE FOR WHEN I LEAVE. Notes:  COPING SKILLS    Status After Intervention:  Improved    Participation Level:  Active Listener    Participation Quality: Appropriate and Attentive      Speech:  normal      Thought Process/Content: Logical      Affective Functioning: Congruent      Mood:  CALM      Level of consciousness:  Alert      Response to Learning: Able to verbalize current knowledge/experience      Endings: None Reported    Modes of Intervention: Education      Discipline Responsible: [address] [employer]      Signature:  Amee Dodge LPN

## 2023-09-11 NOTE — GROUP NOTE
Group Therapy Note    Date: 9/10/2023    Group Start Time: 2000  Group End Time: 2045  Group Topic: Wrap-Up    [facility]    Cathy Salas        Group Therapy Note    Attendees: 4       Patient's Goal:  none    Notes:  happy    Status After Intervention:  Improved    Participation Level:  Active Listener    Participation Quality: Supportive      Speech:  normal      Thought Process/Content: Logical      Affective Functioning: Congruent      Mood:  happy      Level of consciousness:  Alert      Response to Learning: Able to verbalize current knowledge/experience      Endings: None Reported    Modes of Intervention: Socialization      Discipline Responsible: Behavorial Health Tech      Signature:  Cathy Salas

## 2023-09-12 LAB
GLUCOSE BLD STRIP.AUTO-MCNC: 109 MG/DL (ref 65–100)
PERFORMED BY:: ABNORMAL

## 2023-09-12 PROCEDURE — 6370000000 HC RX 637 (ALT 250 FOR IP): Performed by: PSYCHIATRY & NEUROLOGY

## 2023-09-12 PROCEDURE — 1240000000 HC EMOTIONAL WELLNESS R&B

## 2023-09-12 PROCEDURE — 6370000000 HC RX 637 (ALT 250 FOR IP): Performed by: PHYSICIAN ASSISTANT

## 2023-09-12 PROCEDURE — 82962 GLUCOSE BLOOD TEST: CPT

## 2023-09-12 RX ADMIN — LITHIUM CARBONATE 600 MG: 300 CAPSULE, GELATIN COATED ORAL at 20:52

## 2023-09-12 RX ADMIN — ACETAMINOPHEN 650 MG: 325 TABLET, FILM COATED ORAL at 21:00

## 2023-09-12 RX ADMIN — TRAZODONE HYDROCHLORIDE 50 MG: 50 TABLET ORAL at 20:51

## 2023-09-12 RX ADMIN — LISINOPRIL 10 MG: 10 TABLET ORAL at 08:19

## 2023-09-12 RX ADMIN — METFORMIN HYDROCHLORIDE 500 MG: 500 TABLET ORAL at 08:18

## 2023-09-12 RX ADMIN — HYDROCHLOROTHIAZIDE 12.5 MG: 12.5 CAPSULE ORAL at 08:19

## 2023-09-12 RX ADMIN — METFORMIN HYDROCHLORIDE 500 MG: 500 TABLET ORAL at 17:11

## 2023-09-12 RX ADMIN — ACETAMINOPHEN 650 MG: 325 TABLET, FILM COATED ORAL at 10:48

## 2023-09-12 RX ADMIN — OLANZAPINE 10 MG: 2.5 TABLET, FILM COATED ORAL at 20:52

## 2023-09-12 RX ADMIN — LITHIUM CARBONATE 300 MG: 300 CAPSULE, GELATIN COATED ORAL at 08:19

## 2023-09-12 ASSESSMENT — PAIN SCALES - GENERAL
PAINLEVEL_OUTOF10: 7
PAINLEVEL_OUTOF10: 0

## 2023-09-12 ASSESSMENT — PAIN DESCRIPTION - LOCATION: LOCATION: BACK

## 2023-09-12 NOTE — GROUP NOTE
Group Therapy Note    Date: 9/12/2023    Group Start Time: 1250  Group End Time: 1320  Group Topic: Education Group - Inpatient     [address]        Group Therapy Note    Attendees: 2/3    Writer facilitated a psych-ed group about assertive communication. Writer provided a handout regarding the various types of communication (passive, assertive, aggressive) and discussed ways to improve assertive communication. Writer encouraged patients to share. Writer concluded session by encouraging patients to provide input and feedback regarding the group. Patient's Goal:  To attend and participate in groups and activities daily    Notes:  Pt actively participated. Pt stated he needs to work on assertive communication and not being \"passive\" in certain situations. Status After Intervention:  Improved    Participation Level:  Active Listener and Interactive    Participation Quality: Appropriate, Attentive, Sharing, and Supportive      Speech:  normal      Thought Process/Content: Logical  Linear      Affective Functioning: Congruent      Mood: euthymic      Level of consciousness:  Alert, Oriented x4, and Attentive      Response to Learning: Able to verbalize current knowledge/experience, Able to verbalize/acknowledge new learning, Able to retain information, and Progressing to goal      Endings: None Reported    Modes of Intervention: Education      Discipline Responsible: /Counselor      Signature:  [employer]

## 2023-09-12 NOTE — GROUP NOTE
Group Therapy Note    Date: 9/12/2023    Group Start Time: 1320  Group End Time: 4245  Group Topic: Process Group - Inpatient     [address]        Group Therapy Note    Attendees: 2/3    Writer facilitated an inpatient processing group. Writer encouraged patients to process any feelings they may be having, discuss discharge plans, etc. Writer held the space as patients processed. Writer encouraged patients to engage in provided mindfulness activities. Writer concluded session with a grounding exercise and review of coping skills. Patient's Goal:  To attend and participate in groups and activities daily. Notes:  Pt actively participated. Pt was able to discuss discharge plans and discussed his feelings about returning home. Status After Intervention:  Improved    Participation Level:  Active Listener and Interactive    Participation Quality: Appropriate, Attentive, Sharing, and Supportive      Speech:  normal      Thought Process/Content: Logical  Linear      Affective Functioning: Congruent      Mood: euthymic      Level of consciousness:  Alert, Oriented x4, and Attentive      Response to Learning: Able to verbalize/acknowledge new learning, Able to retain information, and Progressing to goal      Endings: None Reported    Modes of Intervention: Exploration and Activity      Discipline Responsible: /Counselor      Signature:  Zayra Finch[employer]

## 2023-09-12 NOTE — GROUP NOTE
Group Therapy Note    Date: 2023    Group Start Time:   Group End Time:   Group Topic: Wrap-Up    [facility]    Leonid Carpenter CNA        Group Therapy Note    Attendees: 3         Patient's Goal:  ***    Notes:  ***    Status After Intervention:  {Status After Intervention:796948116}    Participation Level: {Participation Level:658285622}    Participation Quality: {[facility] PARTICIPATION QUALITY:454412958}      Speech:  {[facility] CD_SPEECH:91401}      Thought Process/Content: {Thought Process/Content:740961186}      Affective Functioning: {Affective Functionin}      Mood: {Mood:314016566}      Level of consciousness:  {Level of consciousness:695459637}      Response to Learnin [facility] Responses to Learnin}      Endings: {[facility] Endings:58153}    Modes of Intervention: {[facility] Modes of Intervention:152009746}      Discipline Responsible: {[facility] Multidisciplinary:649455714}      Signature:   Leonid Carpenter CNA

## 2023-09-13 LAB
DATE LAST DOSE: NORMAL
DOSE AMOUNT: NORMAL UNITS
GLUCOSE BLD STRIP.AUTO-MCNC: 120 MG/DL (ref 65–100)
LITHIUM SERPL-SCNC: 0.96 MMOL/L
PERFORMED BY:: ABNORMAL

## 2023-09-13 PROCEDURE — 6370000000 HC RX 637 (ALT 250 FOR IP): Performed by: PHYSICIAN ASSISTANT

## 2023-09-13 PROCEDURE — 82962 GLUCOSE BLOOD TEST: CPT

## 2023-09-13 PROCEDURE — 80178 ASSAY OF LITHIUM: CPT

## 2023-09-13 PROCEDURE — 1240000000 HC EMOTIONAL WELLNESS R&B

## 2023-09-13 PROCEDURE — 6370000000 HC RX 637 (ALT 250 FOR IP): Performed by: PSYCHIATRY & NEUROLOGY

## 2023-09-13 PROCEDURE — 36415 COLL VENOUS BLD VENIPUNCTURE: CPT

## 2023-09-13 RX ADMIN — METFORMIN HYDROCHLORIDE 500 MG: 500 TABLET ORAL at 09:56

## 2023-09-13 RX ADMIN — LITHIUM CARBONATE 600 MG: 300 CAPSULE, GELATIN COATED ORAL at 21:07

## 2023-09-13 RX ADMIN — LITHIUM CARBONATE 300 MG: 300 CAPSULE, GELATIN COATED ORAL at 09:56

## 2023-09-13 RX ADMIN — LISINOPRIL 10 MG: 10 TABLET ORAL at 09:56

## 2023-09-13 RX ADMIN — OLANZAPINE 10 MG: 2.5 TABLET, FILM COATED ORAL at 21:07

## 2023-09-13 RX ADMIN — METFORMIN HYDROCHLORIDE 500 MG: 500 TABLET ORAL at 18:25

## 2023-09-13 RX ADMIN — ACETAMINOPHEN 650 MG: 325 TABLET, FILM COATED ORAL at 15:46

## 2023-09-13 RX ADMIN — HYDROCHLOROTHIAZIDE 12.5 MG: 12.5 CAPSULE ORAL at 09:56

## 2023-09-13 RX ADMIN — TRAZODONE HYDROCHLORIDE 50 MG: 50 TABLET ORAL at 21:07

## 2023-09-13 ASSESSMENT — PAIN SCALES - GENERAL
PAINLEVEL_OUTOF10: 0
PAINLEVEL_OUTOF10: 0
PAINLEVEL_OUTOF10: 4

## 2023-09-13 ASSESSMENT — PAIN - FUNCTIONAL ASSESSMENT: PAIN_FUNCTIONAL_ASSESSMENT: ACTIVITIES ARE NOT PREVENTED

## 2023-09-13 ASSESSMENT — PAIN DESCRIPTION - ORIENTATION: ORIENTATION: MID

## 2023-09-13 ASSESSMENT — PAIN DESCRIPTION - DESCRIPTORS: DESCRIPTORS: BURNING

## 2023-09-13 ASSESSMENT — PAIN DESCRIPTION - LOCATION: LOCATION: BACK

## 2023-09-13 NOTE — GROUP NOTE
Group Therapy Note    Date: 9/12/2023    Group Start Time: 2000  Group End Time: 2045  Group Topic: Wrap-Up    [facility] [address], CNA        Group Therapy Note    Attendees: 2       Patient's Goal:  none    Notes:  participated in group    Status After Intervention:  Improved    Participation Level: Active Listener and Interactive    Participation Quality: Appropriate and Attentive      Speech:  pressured      Thought Process/Content: Logical      Affective Functioning: Congruent      Mood: anxious and depressed      Level of consciousness:  Alert and Oriented x4      Response to Learning: Able to verbalize current knowledge/experience, Able to verbalize/acknowledge new learning, Able to retain information, Capable of insight, Able to change behavior, and Progressing to goal      Endings: None Reported    Modes of Intervention: Activity      Discipline Responsible: Behavorial Health Tech      Signature:   Jamilah Kendrick CNA

## 2023-09-13 NOTE — GROUP NOTE
Group Therapy Note    Date: 9/13/2023    Group Start Time: 1100  Group End Time: 2119  Group Topic: Nursing    [facility]    Nuzhat Zamudio LPN        Group Therapy Note    Attendees: 3/3       Patient's Goal:  to get home    Notes:  Medication compliance    Status After Intervention:  Unchanged    Participation Level:  Active Listener and Interactive    Participation Quality: Appropriate, Attentive, Sharing, and Supportive      Speech:  normal      Thought Process/Content: Logical      Affective Functioning: Congruent      Mood: anxious and euthymic      Level of consciousness:  Alert, Oriented x4, and Attentive      Response to Learning: Able to retain information, Capable of insight, and Progressing to goal      Endings: None Reported    Modes of Intervention: Education      Discipline Responsible: Licensed Practical Nurse      Signature:  Mariya Sanchez LPN

## 2023-09-13 NOTE — PLAN OF CARE
Problem: Discharge Planning  Goal: Discharge to home or other facility with appropriate resources  9/12/2023 2009 by Matthieu Monahan RN  Outcome: Progressing  9/12/2023 0902 by Nilton Oliveros RN  Outcome: Progressing     Problem: Risk for Elopement  Goal: Patient will not exit the unit/facility without proper excort  9/12/2023 2009 by Matthieu Monahan RN  Outcome: Progressing  9/12/2023 0902 by Nilton Oliveros RN  Outcome: Progressing     Problem: Safety - Adult  Goal: Free from fall injury  9/12/2023 2009 by Matthieu Monahan RN  Outcome: Progressing  9/12/2023 0902 by Nilton Oliveros RN  Outcome: Progressing     Problem: Pain  Goal: Verbalizes/displays adequate comfort level or baseline comfort level  9/12/2023 2009 by Matthieu Monahan RN  Outcome: Progressing  9/12/2023 0902 by Nilton Oliveros RN  Outcome: Progressing     Problem: Chronic Conditions and Co-morbidities  Goal: Patient's chronic conditions and co-morbidity symptoms are monitored and maintained or improved  Outcome: Progressing

## 2023-09-14 VITALS
BODY MASS INDEX: 23.99 KG/M2 | RESPIRATION RATE: 18 BRPM | DIASTOLIC BLOOD PRESSURE: 62 MMHG | WEIGHT: 162 LBS | SYSTOLIC BLOOD PRESSURE: 120 MMHG | HEART RATE: 94 BPM | HEIGHT: 69 IN | OXYGEN SATURATION: 98 % | TEMPERATURE: 97.9 F

## 2023-09-14 PROBLEM — F31.12 BIPOLAR DISORDER, CURR EPISODE MANIC W/O PSYCHOTIC FEATURES, MODERATE (HCC): Status: ACTIVE | Noted: 2023-09-14

## 2023-09-14 PROBLEM — F31.64 BIPOLAR DISORD, CRNT EPISODE MIXED, SEVERE, W PSYCH FEATURES (HCC): Status: RESOLVED | Noted: 2023-09-09 | Resolved: 2023-09-14

## 2023-09-14 LAB
GLUCOSE BLD STRIP.AUTO-MCNC: 104 MG/DL (ref 65–100)
GLUCOSE BLD STRIP.AUTO-MCNC: 190 MG/DL (ref 65–100)
PERFORMED BY:: ABNORMAL
PERFORMED BY:: ABNORMAL

## 2023-09-14 PROCEDURE — 6370000000 HC RX 637 (ALT 250 FOR IP): Performed by: PSYCHIATRY & NEUROLOGY

## 2023-09-14 PROCEDURE — 82962 GLUCOSE BLOOD TEST: CPT

## 2023-09-14 PROCEDURE — 6370000000 HC RX 637 (ALT 250 FOR IP): Performed by: PHYSICIAN ASSISTANT

## 2023-09-14 RX ORDER — HYDROCHLOROTHIAZIDE 12.5 MG/1
12.5 CAPSULE, GELATIN COATED ORAL DAILY
Qty: 30 CAPSULE | Refills: 0 | Status: SHIPPED | OUTPATIENT
Start: 2023-09-14 | End: 2023-10-14

## 2023-09-14 RX ORDER — LISINOPRIL 10 MG/1
10 TABLET ORAL DAILY
Qty: 30 TABLET | Refills: 0 | Status: SHIPPED | OUTPATIENT
Start: 2023-09-14 | End: 2023-10-14

## 2023-09-14 RX ORDER — OLANZAPINE 10 MG/1
10 TABLET ORAL NIGHTLY
Qty: 30 TABLET | Refills: 0 | Status: SHIPPED | OUTPATIENT
Start: 2023-09-14 | End: 2023-10-14

## 2023-09-14 RX ORDER — LITHIUM CARBONATE 300 MG/1
300 CAPSULE ORAL DAILY
Qty: 30 CAPSULE | Refills: 0 | Status: SHIPPED | OUTPATIENT
Start: 2023-09-15 | End: 2023-10-15

## 2023-09-14 RX ORDER — LITHIUM CARBONATE 600 MG/1
600 CAPSULE ORAL
Qty: 30 CAPSULE | Refills: 0 | Status: SHIPPED | OUTPATIENT
Start: 2023-09-14 | End: 2023-10-14

## 2023-09-14 RX ADMIN — LISINOPRIL 10 MG: 10 TABLET ORAL at 08:30

## 2023-09-14 RX ADMIN — METFORMIN HYDROCHLORIDE 500 MG: 500 TABLET ORAL at 08:31

## 2023-09-14 RX ADMIN — ACETAMINOPHEN 650 MG: 325 TABLET, FILM COATED ORAL at 00:20

## 2023-09-14 RX ADMIN — HYDROCHLOROTHIAZIDE 12.5 MG: 12.5 CAPSULE ORAL at 08:30

## 2023-09-14 RX ADMIN — LITHIUM CARBONATE 300 MG: 300 CAPSULE, GELATIN COATED ORAL at 08:31

## 2023-09-14 ASSESSMENT — PAIN SCALES - WONG BAKER: WONGBAKER_NUMERICALRESPONSE: 0

## 2023-09-14 ASSESSMENT — PAIN SCALES - GENERAL
PAINLEVEL_OUTOF10: 8
PAINLEVEL_OUTOF10: 0

## 2023-09-14 ASSESSMENT — PAIN DESCRIPTION - DESCRIPTORS: DESCRIPTORS: ACHING

## 2023-09-14 ASSESSMENT — PAIN DESCRIPTION - LOCATION: LOCATION: OTHER (COMMENT)

## 2023-09-14 NOTE — DISCHARGE SUMMARY
to review them with you. CONTINUE taking these medications      hydroCHLOROthiazide 12.5 MG capsule  Commonly known as: MICROZIDE  Take 1 capsule by mouth daily     lisinopril 10 MG tablet  Commonly known as: PRINIVIL;ZESTRIL  Take 1 tablet by mouth daily     metFORMIN 500 MG tablet  Commonly known as: GLUCOPHAGE  Take 1 tablet by mouth 2 times daily (with meals)               Where to Get Your Medications        These medications were sent to [address] [place] [address] Drive., [address] [place] [address]      Phone: [phone]   hydroCHLOROthiazide 12.5 MG capsule  lisinopril 10 MG tablet  lithium 300 MG capsule  lithium 600 MG capsule  metFORMIN 500 MG tablet  OLANZapine 10 MG tablet                A coordinated, multidisplinary treatment team meeting was conducted with Tiffanie Blue at [facility]. This team consists of the nurse and .          Signed:  Tatiana Pascual MD Psychiatry

## 2023-09-14 NOTE — GROUP NOTE
Group Therapy Note    Date: 9/13/2023    Group Start Time: 2000  Group End Time: 2045  Group Topic: Wrap-Up    [facility]    Mendel Pipes        Group Therapy Note    Attendees: 3         Patient's Goal:  npne    Notes:  happy    Status After Intervention:  Improved    Participation Level:  Active Listener    Participation Quality: Supportive      Speech:  normal      Thought Process/Content: Logical      Affective Functioning: Congruent      Mood:  happy      Level of consciousness:  Alert      Response to Learning: Able to verbalize current knowledge/experience      Endings: None Reported    Modes of Intervention: Socialization      Discipline Responsible: Behavorial Health Tech      Signature:  Mendel Pipes

## 2023-09-14 NOTE — GROUP NOTE
Group Therapy Note    Date: 9/14/2023    Group Start Time: 0900  Group End Time: 1000  Group Topic: Community Meeting    [facility]        Group Therapy Note    Attendees: 5       Patient's Goal:  To go home  Status After Intervention:  Improved    Participation Level: None    Participation Quality: Appropriate and Supportive      Speech:  normal      Thought Process/Content: Logical      Affective Functioning: Congruent      Mood:  Happy      Level of consciousness:  Alert      Response to Learning: Able to verbalize current knowledge/experience and Able to retain information      Endings: None Reported    Modes of Intervention: Support and Limit-setting      Discipline Responsible: [address] [facility]      Signature:  Lula Manzano

## 2023-09-14 NOTE — PLAN OF CARE
Problem: Discharge Planning  Goal: Discharge to home or other facility with appropriate resources  9/13/2023 2039 by Obey Quiñonez RN  Outcome: Progressing  9/13/2023 0829 by Delano Peres LPN  Outcome: Progressing     Problem: Risk for Elopement  Goal: Patient will not exit the unit/facility without proper excort  9/13/2023 2039 by Obey Quiñonez RN  Outcome: Progressing  9/13/2023 0829 by Delano Peres LPN  Outcome: Progressing     Problem: Safety - Adult  Goal: Free from fall injury  9/13/2023 2039 by Obey Quiñonez RN  Outcome: Progressing  9/13/2023 0829 by Delano Peres LPN  Outcome: Progressing     Problem: Pain  Goal: Verbalizes/displays adequate comfort level or baseline comfort level  9/13/2023 2039 by Obey Quiñonez RN  Outcome: Progressing  9/13/2023 0829 by Delano Peres LPN  Outcome: Progressing     Problem: Chronic Conditions and Co-morbidities  Goal: Patient's chronic conditions and co-morbidity symptoms are monitored and maintained or improved  9/13/2023 2039 by Obey Quiñonez RN  Outcome: Progressing  9/13/2023 0829 by Delano Peres LPN  Outcome: Progressing

## 2025-06-09 NOTE — H&P
Subjective:   Patient ID: Sandi Knox is a 8 month old female.    HPI Here with mom. Mom notes patient started to have runny nose yesterday and felt warm today. Mom was out and was able to get thermometer strips for patient's forehead. Read 102. Patient is eating normally, acting normally. No cough, no increased WOB.     History/Other:   Review of Systems   Constitutional:  Negative for activity change and appetite change.   HENT:  Negative for congestion and ear discharge.    Respiratory:  Negative for cough and wheezing.      Current Medications[1]  Allergies:Allergies[2]    Objective:   Physical Exam  Vitals reviewed.   Constitutional:       General: She is active.      Appearance: Normal appearance. She is well-developed.   HENT:      Head: Normocephalic and atraumatic. Anterior fontanelle is flat.      Right Ear: Tympanic membrane and ear canal normal.      Left Ear: Tympanic membrane and ear canal normal.      Mouth/Throat:      Mouth: Mucous membranes are moist.      Pharynx: Oropharynx is clear.   Cardiovascular:      Rate and Rhythm: Normal rate and regular rhythm.      Heart sounds: Normal heart sounds.   Pulmonary:      Effort: Pulmonary effort is normal.      Breath sounds: Normal breath sounds.   Abdominal:      General: There is no distension.   Neurological:      Mental Status: She is alert.         Assessment & Plan:   1. Fever, unspecified fever cause    Possible viral illness. Reviewed dosing for tylenol/ibuprofen. Keep well-hydrated. Monitor oral intake. F/u if change in activity/appetite or if additional symptoms develop, or if fever persists x 5+ days. Mom will get rectal thermometer and use to check temp.     No orders of the defined types were placed in this encounter.      Meds This Visit:  Requested Prescriptions      No prescriptions requested or ordered in this encounter       Imaging & Referrals:  None         [1]   No current outpatient medications on file.   [2] No Known  Allergies     available.           ESTIMATED LENGTH OF STAY:    TBD       STRENGTHS:  Motivated and ready for change, Realizes one's potential, and no violence in the home                 Total time: 55 minutes                         SIGNED:    Gia Manrique, PhD, PA-C, Psychiatry  9/6/2023